# Patient Record
Sex: FEMALE | URBAN - METROPOLITAN AREA
[De-identification: names, ages, dates, MRNs, and addresses within clinical notes are randomized per-mention and may not be internally consistent; named-entity substitution may affect disease eponyms.]

---

## 2020-08-11 ENCOUNTER — OFFICE VISIT (OUTPATIENT)
Dept: URBAN - METROPOLITAN AREA CLINIC 48 | Facility: CLINIC | Age: 60
End: 2020-08-11
Payer: COMMERCIAL

## 2020-08-11 DIAGNOSIS — H33.311 HORSESHOE TEAR OF RETINA WITHOUT DETACHMENT, RIGHT EYE: Primary | ICD-10-CM

## 2020-08-11 PROCEDURE — 92004 COMPRE OPH EXAM NEW PT 1/>: CPT | Performed by: OPHTHALMOLOGY

## 2020-08-11 ASSESSMENT — KERATOMETRY
OD: 43.13
OS: 43.00

## 2020-08-11 ASSESSMENT — INTRAOCULAR PRESSURE
OD: 16
OS: 17

## 2020-08-11 NOTE — IMPRESSION/PLAN
Impression: Horseshoe tear of retina without detachment, right eye: H33.311. Plan: Horseshoe tear  OD Adjacent hole at 2 o'clock, don't see SRF on Ultrasound B, no Retinal detachment seen. Patient advised to come NPO. Refer to Dr. Hussain Manzano tomorrow

## 2020-08-12 ENCOUNTER — OFFICE VISIT (OUTPATIENT)
Dept: URBAN - METROPOLITAN AREA CLINIC 48 | Facility: CLINIC | Age: 60
End: 2020-08-12
Payer: COMMERCIAL

## 2020-08-12 ENCOUNTER — SURGERY (OUTPATIENT)
Dept: URBAN - METROPOLITAN AREA SURGERY 26 | Facility: SURGERY | Age: 60
End: 2020-08-12
Payer: COMMERCIAL

## 2020-08-12 PROCEDURE — 67145 PROPH RTA DTCHMNT PC: CPT | Performed by: OPHTHALMOLOGY

## 2020-08-12 PROCEDURE — 99213 OFFICE O/P EST LOW 20 MIN: CPT | Performed by: OPHTHALMOLOGY

## 2020-08-12 ASSESSMENT — INTRAOCULAR PRESSURE
OS: 17
OD: 15
OS: 17
OD: 15

## 2020-08-12 NOTE — IMPRESSION/PLAN
Impression: Horseshoe tear of retina without detachment, right eye: H33.311. OD. Plan: OCT ordered and performed today. Discussed diagnosis with patient. The clinical exam is consistent with a retinal tear. The patient was advised this could progress into a retinal detachment causing further visual loss if urgent treatment is not performed. We discussed the natural history of retinal tears and the R/B/A of the treatment options including laser retinopexy, Cryotherapy and Observation. Risk of treatment include reduced peripheral vision, pain, swelling, Intraocular hemorrhage, progressive retinal tear or detachment and the possible need for sx. After discussing the R/B/A of each treatment option. The patient elects to proceed with laser treatment to the retinal tear in the right eye.  RL-1

## 2020-08-19 ENCOUNTER — POST-OPERATIVE VISIT (OUTPATIENT)
Dept: URBAN - METROPOLITAN AREA CLINIC 48 | Facility: CLINIC | Age: 60
End: 2020-08-19
Payer: COMMERCIAL

## 2020-08-19 DIAGNOSIS — Z09 ENCNTR FOR F/U EXAM AFT TRTMT FOR COND OTH THAN MALIG NEOPLM: Primary | ICD-10-CM

## 2020-08-19 PROCEDURE — 99024 POSTOP FOLLOW-UP VISIT: CPT | Performed by: OPHTHALMOLOGY

## 2020-08-19 ASSESSMENT — INTRAOCULAR PRESSURE
OS: 20
OD: 19

## 2020-08-25 ENCOUNTER — POST-OPERATIVE VISIT (OUTPATIENT)
Dept: URBAN - METROPOLITAN AREA CLINIC 48 | Facility: CLINIC | Age: 60
End: 2020-08-25
Payer: COMMERCIAL

## 2020-08-25 PROCEDURE — 99024 POSTOP FOLLOW-UP VISIT: CPT | Performed by: OPTOMETRIST

## 2020-08-25 ASSESSMENT — INTRAOCULAR PRESSURE
OD: 18
OS: 16

## 2020-08-25 NOTE — IMPRESSION/PLAN
Impression: S/P PCA Laser, RD, Prophylaxis Laser OD - 13 Days. Encounter for surgical aftercare following surgery on a sense organ  Z48.810. Post operative instructions reviewed - Plan: --Continue Artificial Tears OU PRN Keep appointment on 9/16/2020 PO with Dr. Dereje Galicia

## 2020-09-16 ENCOUNTER — POST-OPERATIVE VISIT (OUTPATIENT)
Dept: URBAN - METROPOLITAN AREA CLINIC 48 | Facility: CLINIC | Age: 60
End: 2020-09-16
Payer: COMMERCIAL

## 2020-09-16 PROCEDURE — 99024 POSTOP FOLLOW-UP VISIT: CPT | Performed by: OPHTHALMOLOGY

## 2020-09-16 ASSESSMENT — INTRAOCULAR PRESSURE
OD: 15
OS: 18

## 2020-09-16 NOTE — IMPRESSION/PLAN
Impression: S/P RD/ PCA laser OD - 35 Days. Encounter for surgical aftercare following surgery on a sense organ  Z48.810. Plan: --Advised patient to use artificial tears for comfort.

## 2020-11-12 ENCOUNTER — OFFICE VISIT (OUTPATIENT)
Dept: URBAN - METROPOLITAN AREA CLINIC 48 | Facility: CLINIC | Age: 60
End: 2020-11-12
Payer: COMMERCIAL

## 2020-11-12 DIAGNOSIS — H33.011 RETINAL DETACHMENT WITH SINGLE BREAK, RIGHT EYE: Primary | ICD-10-CM

## 2020-11-12 PROCEDURE — 92012 INTRM OPH EXAM EST PATIENT: CPT | Performed by: OPHTHALMOLOGY

## 2020-11-12 ASSESSMENT — INTRAOCULAR PRESSURE
OS: 16
OD: 15

## 2020-11-12 NOTE — IMPRESSION/PLAN
Impression: Retinal detachment with single break, right eye: H33.011. Plan: Supero retinal detachment
advised patient to rest, sleep on OS side RC with Dr. Jacque DAWN, tomorrow

## 2020-11-13 ENCOUNTER — OFFICE VISIT (OUTPATIENT)
Dept: URBAN - METROPOLITAN AREA CLINIC 48 | Facility: CLINIC | Age: 60
End: 2020-11-13
Payer: COMMERCIAL

## 2020-11-13 DIAGNOSIS — H33.021 RETINAL DETACHMENT WITH MULTIPLE BREAKS, RIGHT EYE: Primary | ICD-10-CM

## 2020-11-13 PROCEDURE — 92134 CPTRZ OPH DX IMG PST SGM RTA: CPT | Performed by: OPHTHALMOLOGY

## 2020-11-13 PROCEDURE — 99214 OFFICE O/P EST MOD 30 MIN: CPT | Performed by: OPHTHALMOLOGY

## 2020-11-13 RX ORDER — OFLOXACIN 3 MG/ML
0.3 % SOLUTION/ DROPS OPHTHALMIC
Qty: 1 | Refills: 0 | Status: INACTIVE
Start: 2020-11-13 | End: 2021-05-14

## 2020-11-13 RX ORDER — PREDNISOLONE ACETATE 10 MG/ML
1 % SUSPENSION/ DROPS OPHTHALMIC
Qty: 1 | Refills: 2 | Status: INACTIVE
Start: 2020-11-13 | End: 2020-12-09

## 2020-11-13 ASSESSMENT — INTRAOCULAR PRESSURE
OS: 18
OD: 13

## 2020-11-13 NOTE — IMPRESSION/PLAN
Impression: Retinal detachment with multiple breaks, right eye: H33.021. Plan: OCT ordered and performed today. Discussed diagnosis with patient. The clinical exam is consistent with a macula sparing retinal detachment. To reduce the risk of further vision loss, urgent surgical intervention is strongly recommended. Discussed treatment options, the various techniques to repair an RD discussed with patient including scleral buckle, silicone oil and pneumatic retinopexy. In addition altitude with gas bubble were discussed. Recommend sx, after a through discussion of surgical R/B/A. The patient understands the potential risks of sx, including (but not limited to) bleeding, pain, infection, loss of vision, loss of eye and possible need for more sx. The patient also understands that pre detachment visual acuity may not return. The patient elects to proceed with sx RL-2 Schedule Sx. in X ASAP.

## 2020-11-14 ENCOUNTER — SURGERY (OUTPATIENT)
Dept: URBAN - METROPOLITAN AREA SURGERY 15 | Facility: SURGERY | Age: 60
End: 2020-11-14
Payer: COMMERCIAL

## 2020-11-14 PROCEDURE — 67107 REPAIR DETACHED RETINA: CPT | Performed by: OPHTHALMOLOGY

## 2020-11-14 RX ORDER — OXYCODONE HYDROCHLORIDE AND ACETAMINOPHEN 5; 325 MG/1; MG/1
TABLET ORAL
Qty: 8 | Refills: 0 | Status: INACTIVE
Start: 2020-11-14 | End: 2021-05-14

## 2020-11-14 RX ORDER — ONDANSETRON HYDROCHLORIDE 4 MG/1
4 MG TABLET, FILM COATED ORAL
Qty: 4 | Refills: 0 | Status: INACTIVE
Start: 2020-11-14 | End: 2021-05-14

## 2020-11-18 ENCOUNTER — POST-OPERATIVE VISIT (OUTPATIENT)
Dept: URBAN - METROPOLITAN AREA CLINIC 48 | Facility: CLINIC | Age: 60
End: 2020-11-18
Payer: COMMERCIAL

## 2020-11-18 DIAGNOSIS — Z48.810 ENCOUNTER FOR SURGICAL AFTERCARE FOLLOWING SURGERY ON THE SENSE ORGANS: Primary | ICD-10-CM

## 2020-11-18 PROCEDURE — 99024 POSTOP FOLLOW-UP VISIT: CPT | Performed by: OPHTHALMOLOGY

## 2020-11-18 ASSESSMENT — INTRAOCULAR PRESSURE: OD: 14

## 2020-12-09 ENCOUNTER — POST-OPERATIVE VISIT (OUTPATIENT)
Dept: URBAN - METROPOLITAN AREA CLINIC 48 | Facility: CLINIC | Age: 60
End: 2020-12-09
Payer: COMMERCIAL

## 2020-12-09 PROCEDURE — 99024 POSTOP FOLLOW-UP VISIT: CPT | Performed by: OPHTHALMOLOGY

## 2020-12-09 RX ORDER — PREDNISOLONE ACETATE 10 MG/ML
1 % SUSPENSION/ DROPS OPHTHALMIC
Qty: 1 | Refills: 2 | Status: INACTIVE
Start: 2020-12-09 | End: 2021-05-14

## 2020-12-09 ASSESSMENT — INTRAOCULAR PRESSURE
OS: 17
OD: 14

## 2020-12-09 NOTE — IMPRESSION/PLAN
Impression: S/P 99453 Scleral Buckle, Retinal Cryopexy OD - 25 Days. Encounter for surgical aftercare following surgery on a sense organ  Z48.810. Plan: Discussed macular hole development, Advised waiting to see if hole resolves on its own, if not discussed Sx. to repair and the possiblity of cataract Sx. 12 months after, will re evaluate in 1 month. Contiune Pred and AFT as directed, RTC in 1 month for Post OP and OCT. Decrease Pred QID OD and D/C Ocuflox.  AFTs PRN OU

## 2020-12-23 ENCOUNTER — OFFICE VISIT (OUTPATIENT)
Dept: URBAN - METROPOLITAN AREA CLINIC 48 | Facility: CLINIC | Age: 60
End: 2020-12-23
Payer: COMMERCIAL

## 2020-12-23 DIAGNOSIS — H35.341 MACULAR CYST, HOLE, OR PSEUDOHOLE, RIGHT EYE: ICD-10-CM

## 2020-12-23 PROCEDURE — 92012 INTRM OPH EXAM EST PATIENT: CPT | Performed by: OPHTHALMOLOGY

## 2020-12-23 ASSESSMENT — VISUAL ACUITY: OD: 20/70

## 2020-12-23 ASSESSMENT — INTRAOCULAR PRESSURE
OD: 16
OS: 15
OD: 15
OS: 17

## 2020-12-23 NOTE — IMPRESSION/PLAN
Impression: Macular cyst, hole, or pseudohole, right eye: H35.341 Right.  Plan: see Dr. Tiara Plascencia plan from earlier today

## 2021-05-14 ENCOUNTER — OFFICE VISIT (OUTPATIENT)
Dept: URBAN - METROPOLITAN AREA CLINIC 48 | Facility: CLINIC | Age: 61
End: 2021-05-14
Payer: COMMERCIAL

## 2021-05-14 PROCEDURE — 99213 OFFICE O/P EST LOW 20 MIN: CPT | Performed by: OPHTHALMOLOGY

## 2021-05-14 PROCEDURE — 92134 CPTRZ OPH DX IMG PST SGM RTA: CPT | Performed by: OPHTHALMOLOGY

## 2021-05-14 ASSESSMENT — INTRAOCULAR PRESSURE
OS: 18
OD: 16

## 2021-05-14 NOTE — IMPRESSION/PLAN
Impression: Age-related nuclear cataract, right eye: H25.11. Right. S/p Mac hole repair S/p RD repair Plan: No retinal contraindication to CE IOL. Discussed with the patient in length the different options she can ask for if she would like to stay near sighted. Patient understands and agrees with the plan.

## 2021-06-28 ENCOUNTER — OFFICE VISIT (OUTPATIENT)
Dept: URBAN - METROPOLITAN AREA CLINIC 48 | Facility: CLINIC | Age: 61
End: 2021-06-28
Payer: COMMERCIAL

## 2021-06-28 PROCEDURE — 92014 COMPRE OPH EXAM EST PT 1/>: CPT | Performed by: STUDENT IN AN ORGANIZED HEALTH CARE EDUCATION/TRAINING PROGRAM

## 2021-06-28 PROCEDURE — 92134 CPTRZ OPH DX IMG PST SGM RTA: CPT | Performed by: STUDENT IN AN ORGANIZED HEALTH CARE EDUCATION/TRAINING PROGRAM

## 2021-06-28 ASSESSMENT — INTRAOCULAR PRESSURE
OD: 15
OS: 17

## 2021-06-28 NOTE — IMPRESSION/PLAN
Impression: Vitreous degeneration, left eye: H43.812. No RD/RT on  
OCT MAC Findings: post surgical changes OD, no IRF/SRF OS

OS 
small area of lattice high concern for RT given hx. Plan:  All signs and risks of retinal detachment and tears were discussed in detail. Patient instructed to call the office immediately if any symptoms noted.   Recommend the patient return to office for follow up tomorrow with Dr. Michelle Hines and possibly Wednesday with Dr. Hema Thomas, pending Dr. Michelle Hines exam.

## 2021-06-29 ENCOUNTER — OFFICE VISIT (OUTPATIENT)
Dept: URBAN - METROPOLITAN AREA CLINIC 48 | Facility: CLINIC | Age: 61
End: 2021-06-29
Payer: COMMERCIAL

## 2021-06-29 DIAGNOSIS — H43.812 VITREOUS DEGENERATION, LEFT EYE: Primary | ICD-10-CM

## 2021-06-29 PROCEDURE — 92014 COMPRE OPH EXAM EST PT 1/>: CPT | Performed by: OPHTHALMOLOGY

## 2021-06-29 ASSESSMENT — INTRAOCULAR PRESSURE
OS: 16
OD: 13

## 2021-06-29 NOTE — IMPRESSION/PLAN
Impression: Vitreous degeneration, left eye: H43.812. No RD/RT on  
OCT MAC Findings: post surgical changes OD, no IRF/SRF OS

OS 
small area of lattice high concern for RT given hx. Plan: lattice with out retina elevation and no heme. Discussed strict RD precautions with patient, patient understands. If patient notices any shade or curtain over the eye in any direction, patient to call office to be seen sooner. RTC July 8th DE with Dr. Kye Davenport RTC July 21st DE/OCT mac with Dr. Severo Aguilera
or PRN

## 2021-07-01 ENCOUNTER — OFFICE VISIT (OUTPATIENT)
Dept: URBAN - METROPOLITAN AREA CLINIC 48 | Facility: CLINIC | Age: 61
End: 2021-07-01
Payer: COMMERCIAL

## 2021-07-01 PROCEDURE — 92014 COMPRE OPH EXAM EST PT 1/>: CPT | Performed by: OPHTHALMOLOGY

## 2021-07-01 ASSESSMENT — INTRAOCULAR PRESSURE
OD: 16
OS: 18

## 2021-07-01 NOTE — IMPRESSION/PLAN
Impression: Vitreous degeneration, left eye: H43.812. No RD/RT on  Plan: Patient is symptomatic, on depressed exam there are no tears seen OS. Patient has a indeterminate Clinton OS, 2 days ago patient has a negative Clinton. Patient is concerned for Retinal detachment, patient has history of RD to the right eye . RD precautions discussed with patient. Patient offered to see RCA tomorrow for a consult or have observation throughout the weekend. Patient would like to see RCA. Refer to RCA tomorrow for worsening PVD. RTC Wednesday DE with Dr. Solo Patel  
per Dr. Yadira Conteh and Dr. Ewelina Morton

## 2021-07-02 ENCOUNTER — OFFICE VISIT (OUTPATIENT)
Dept: URBAN - METROPOLITAN AREA CLINIC 7 | Facility: CLINIC | Age: 61
End: 2021-07-02
Payer: COMMERCIAL

## 2021-07-02 DIAGNOSIS — H25.13 AGE-RELATED NUCLEAR CATARACT, BILATERAL: ICD-10-CM

## 2021-07-02 DIAGNOSIS — H33.8 OTHER RETINAL DETACHMENT: ICD-10-CM

## 2021-07-02 PROCEDURE — 99204 OFFICE O/P NEW MOD 45 MIN: CPT | Performed by: OPHTHALMOLOGY

## 2021-07-02 ASSESSMENT — INTRAOCULAR PRESSURE
OD: 16
OS: 18

## 2021-07-02 NOTE — IMPRESSION/PLAN
Impression: Macular cyst, hole, or pseudohole, right eye: H35.341 Right.  Plan: --small LMH confirmed on OCT testing
--monitor coagulation(Bleeding disorder R/T clinical cond/anti-coags)

## 2021-07-02 NOTE — IMPRESSION/PLAN
Impression: Vitreous degeneration, left eye: H43.812. Left. Plan: --exam confirms an acute hemorrhagic PVD OS
--no e/o RT/RD on DFE with scleral depression
--OCT shows complete hyaloid separation from macula --findings/diagnosis d/w pt in detail 
--RT/RD warning symptoms discussed --pt understands to call immediately with vision changes F/U with Dr. Miguel Angel Carpio in [place] next week as scheduled

## 2021-07-02 NOTE — IMPRESSION/PLAN
Impression: Age-related nuclear cataract, bilateral: H25.13. Bilateral. Plan: --VS post PPV cataract OD, will need CE/IOL at a later date
--NVS OS, monitor

## 2021-07-02 NOTE — IMPRESSION/PLAN
Impression: Other retinal detachment: H33.8. Right. 
S/P SBP/PPV Plan: --retina remains attached and stable
--RDW discussed

## 2021-07-07 ENCOUNTER — SURGERY (OUTPATIENT)
Dept: URBAN - METROPOLITAN AREA SURGERY 26 | Facility: SURGERY | Age: 61
End: 2021-07-07
Payer: COMMERCIAL

## 2021-07-07 ENCOUNTER — OFFICE VISIT (OUTPATIENT)
Dept: URBAN - METROPOLITAN AREA CLINIC 48 | Facility: CLINIC | Age: 61
End: 2021-07-07
Payer: COMMERCIAL

## 2021-07-07 DIAGNOSIS — H33.012 RETINAL DETACHMENT WITH SINGLE BREAK, LEFT EYE: Primary | ICD-10-CM

## 2021-07-07 PROCEDURE — 99213 OFFICE O/P EST LOW 20 MIN: CPT | Performed by: OPHTHALMOLOGY

## 2021-07-07 PROCEDURE — 92134 CPTRZ OPH DX IMG PST SGM RTA: CPT | Performed by: OPHTHALMOLOGY

## 2021-07-07 PROCEDURE — 67107 REPAIR DETACHED RETINA: CPT | Performed by: OPHTHALMOLOGY

## 2021-07-07 ASSESSMENT — INTRAOCULAR PRESSURE
OD: 15
OD: 15
OS: 17
OS: 17

## 2021-07-07 NOTE — IMPRESSION/PLAN
Impression: Retinal detachment with single break, left eye: H33.012. Left. Plan: OCT ordered and performed today. Discussed diagnosis with patient. The clinical exam is consistent with a macula sparing retinal detachment. To reduce the risk of further vision loss, urgent surgical intervention is strongly recommended. Discussed treatment options, the various techniques to repair an RD discussed with patient including scleral buckle, silicone oil and pneumatic retinopexy. In addition altitude with gas bubble were discussed. Recommend sx, after a through discussion of surgical R/B/A. The patient understands the potential risks of sx, including (but not limited to) bleeding, pain, infection, loss of vision, loss of eye and possible need for more sx. The patient also understands that pre detachment visual acuity may not return. The patient elects to proceed with sx OS.  RL-2

## 2021-07-08 ENCOUNTER — POST-OPERATIVE VISIT (OUTPATIENT)
Dept: URBAN - METROPOLITAN AREA CLINIC 48 | Facility: CLINIC | Age: 61
End: 2021-07-08
Payer: COMMERCIAL

## 2021-07-08 PROCEDURE — 99024 POSTOP FOLLOW-UP VISIT: CPT | Performed by: STUDENT IN AN ORGANIZED HEALTH CARE EDUCATION/TRAINING PROGRAM

## 2021-07-08 RX ORDER — PREDNISOLONE ACETATE 10 MG/ML
1 % SUSPENSION/ DROPS OPHTHALMIC
Qty: 5 | Refills: 1 | Status: INACTIVE
Start: 2021-07-08 | End: 2021-08-10

## 2021-07-08 RX ORDER — OFLOXACIN 3 MG/ML
0.3 % SOLUTION/ DROPS OPHTHALMIC
Qty: 5 | Refills: 1 | Status: INACTIVE
Start: 2021-07-08 | End: 2021-07-15

## 2021-07-08 ASSESSMENT — INTRAOCULAR PRESSURE: OS: 25

## 2021-07-08 NOTE — IMPRESSION/PLAN
Impression: S/P (66444) Retinal Detachment; Scleral Buckle; Retinal Cryopexy OS - 1 Day. Encounter for surgical aftercare following surgery on a sense organ  Z48.810. Post operative instructions reviewed - Condition is improving - Patent to use OFL and PF qid OS Plan: Keep appointment with Dr. Jojo Baker

## 2021-07-14 ENCOUNTER — POST-OPERATIVE VISIT (OUTPATIENT)
Dept: URBAN - METROPOLITAN AREA CLINIC 48 | Facility: CLINIC | Age: 61
End: 2021-07-14
Payer: COMMERCIAL

## 2021-07-14 PROCEDURE — 99024 POSTOP FOLLOW-UP VISIT: CPT | Performed by: OPHTHALMOLOGY

## 2021-07-14 ASSESSMENT — INTRAOCULAR PRESSURE
OS: 18
OD: 18

## 2021-07-14 NOTE — IMPRESSION/PLAN
Impression: S/P (89021) Retinal Detachment; Scleral Buckle; Retinal Cryopexy OS - 7 Days. Encounter for surgical aftercare following surgery on a sense organ  Z48.810.  Plan: --Continue Prednisolone acetate QID OS

## 2021-07-15 ENCOUNTER — OFFICE VISIT (OUTPATIENT)
Dept: URBAN - METROPOLITAN AREA CLINIC 48 | Facility: CLINIC | Age: 61
End: 2021-07-15
Payer: COMMERCIAL

## 2021-07-15 DIAGNOSIS — H25.11 AGE-RELATED NUCLEAR CATARACT, RIGHT EYE: Primary | ICD-10-CM

## 2021-07-15 PROCEDURE — 99080 SPECIAL REPORTS OR FORMS: CPT | Performed by: OPHTHALMOLOGY

## 2021-07-15 PROCEDURE — 92014 COMPRE OPH EXAM EST PT 1/>: CPT | Performed by: OPHTHALMOLOGY

## 2021-07-15 ASSESSMENT — INTRAOCULAR PRESSURE
OS: 16
OD: 12

## 2021-07-15 NOTE — IMPRESSION/PLAN
Impression: Age-related nuclear cataract, right eye: H25.11. Plan: The patient has a visually significant cataract in the right eye. After discussion with the patient and careful examination it has been determined that a cataract in the right eye is accounting for a significant amount of the patient's visual symptoms. Cataract surgery and the associated risks, benefits, alternatives, expectations, and recovery were discussed in detail with the patient. All questions were answered. The patient understands that there may be some limitation in visual potential given any pre-existing ocular disease. Discussed option of eye drops with patient, patient elects Prednisolone, Ketorolac, Ofloxacin   , discussed possible side effects of drops. The patient desires cataract surgery in the right eye. Schedule cataract surgery in the right eye. RL 2 Patient candidate for  Standard lens distance target Surgeon: Dr. Thiago Carmona

## 2021-07-20 ENCOUNTER — TESTING ONLY (OUTPATIENT)
Dept: URBAN - METROPOLITAN AREA CLINIC 48 | Facility: CLINIC | Age: 61
End: 2021-07-20
Payer: COMMERCIAL

## 2021-07-20 PROCEDURE — 92014 COMPRE OPH EXAM EST PT 1/>: CPT | Performed by: STUDENT IN AN ORGANIZED HEALTH CARE EDUCATION/TRAINING PROGRAM

## 2021-07-20 PROCEDURE — 92136 OPHTHALMIC BIOMETRY: CPT | Performed by: OPHTHALMOLOGY

## 2021-07-20 PROCEDURE — 92134 CPTRZ OPH DX IMG PST SGM RTA: CPT | Performed by: STUDENT IN AN ORGANIZED HEALTH CARE EDUCATION/TRAINING PROGRAM

## 2021-07-20 ASSESSMENT — INTRAOCULAR PRESSURE
OD: 16
OS: 15
OD: 16
OS: 15

## 2021-07-20 ASSESSMENT — PACHYMETRY
OD: 3.39
OD: 25.69

## 2021-07-20 ASSESSMENT — KERATOMETRY: OD: 43.05

## 2021-07-20 NOTE — IMPRESSION/PLAN
Impression: Vitreous detachment of left eye: H43.812. OCT MAC findings: OD Mild disruption to foveval contour,  OS Normal Plan: -No RT/RD noted on today's exam
-RD/RT precautions reviewed. Patient knows to rtc or call if any symptoms develop
-Discussed w/pt. hold off on exam with optometrist until cleared at post op cataract visit RTC tomorrow with Dr. Yosvany Dover for Post-Op visit

## 2021-07-21 ENCOUNTER — POST-OPERATIVE VISIT (OUTPATIENT)
Dept: URBAN - METROPOLITAN AREA CLINIC 48 | Facility: CLINIC | Age: 61
End: 2021-07-21
Payer: COMMERCIAL

## 2021-07-21 PROCEDURE — 99024 POSTOP FOLLOW-UP VISIT: CPT | Performed by: OPHTHALMOLOGY

## 2021-07-21 RX ORDER — ERYTHROMYCIN 5 MG/G
OINTMENT OPHTHALMIC
Qty: 1 | Refills: 1 | Status: INACTIVE
Start: 2021-07-21 | End: 2021-12-22

## 2021-07-21 ASSESSMENT — INTRAOCULAR PRESSURE
OS: 14
OD: 12

## 2021-07-21 NOTE — IMPRESSION/PLAN
Impression: S/P (05201) Retinal Detachment; Scleral Buckle; Retinal Cryopexy OS - 14 Days. Encounter for surgical aftercare following surgery on a sense organ  Z48.810. Plan: --Advised patient to use artificial tears for comfort.  Continue Pred QID OS

## 2021-08-03 ENCOUNTER — SURGERY (OUTPATIENT)
Dept: URBAN - METROPOLITAN AREA SURGERY 26 | Facility: SURGERY | Age: 61
End: 2021-08-03
Payer: COMMERCIAL

## 2021-08-03 PROCEDURE — 66984 XCAPSL CTRC RMVL W/O ECP: CPT | Performed by: OPHTHALMOLOGY

## 2021-08-04 ENCOUNTER — POST-OPERATIVE VISIT (OUTPATIENT)
Dept: URBAN - METROPOLITAN AREA CLINIC 48 | Facility: CLINIC | Age: 61
End: 2021-08-04
Payer: COMMERCIAL

## 2021-08-04 PROCEDURE — 99024 POSTOP FOLLOW-UP VISIT: CPT | Performed by: OPHTHALMOLOGY

## 2021-08-04 ASSESSMENT — INTRAOCULAR PRESSURE
OD: 16
OS: 16

## 2021-08-04 NOTE — IMPRESSION/PLAN
Impression: S/P Cataract Extraction by phacoemulsification with IOL placement 58063 OD - 1 Day. Encounter for surgical aftercare following surgery on a sense organ  Z48.810.  Post operative instructions reviewed -

PF, RAFAEL, OFL qid OD
PF  qid OS Plan: RTC keep appointment with Dr. Phillip Simple Aug 10, no dilation needed only if patient has symptoms,  Dr. Bev Orta on Aug11

## 2021-08-10 ENCOUNTER — POST-OPERATIVE VISIT (OUTPATIENT)
Dept: URBAN - METROPOLITAN AREA CLINIC 48 | Facility: CLINIC | Age: 61
End: 2021-08-10
Payer: COMMERCIAL

## 2021-08-10 PROCEDURE — 99024 POSTOP FOLLOW-UP VISIT: CPT | Performed by: OPHTHALMOLOGY

## 2021-08-10 RX ORDER — PREDNISOLONE ACETATE 10 MG/ML
1 % SUSPENSION/ DROPS OPHTHALMIC
Qty: 5 | Refills: 1 | Status: INACTIVE
Start: 2021-08-10 | End: 2021-12-22

## 2021-08-10 ASSESSMENT — INTRAOCULAR PRESSURE
OD: 13
OS: 12

## 2021-08-10 NOTE — IMPRESSION/PLAN
Impression: S/P Cataract Extraction by phacoemulsification with IOL placement 97753 OD - 7 Days. Encounter for surgical aftercare following surgery on a sense organ  Z48.810.  Excellent post op course Plan: patient to use Pred 5 times daily OD, 
continue regimine in OS, 

patient to keep appt with Dr. Fran Wing

## 2021-08-11 ENCOUNTER — POST-OPERATIVE VISIT (OUTPATIENT)
Dept: URBAN - METROPOLITAN AREA CLINIC 48 | Facility: CLINIC | Age: 61
End: 2021-08-11
Payer: COMMERCIAL

## 2021-08-11 PROCEDURE — 99024 POSTOP FOLLOW-UP VISIT: CPT | Performed by: OPHTHALMOLOGY

## 2021-08-11 ASSESSMENT — INTRAOCULAR PRESSURE
OS: 16
OD: 18

## 2021-08-11 NOTE — IMPRESSION/PLAN
Impression: S/P (37292) Retinal Detachment; Scleral Buckle; Retinal Cryopexy OS - 35 Days. Encounter for surgical aftercare following surgery on a sense organ  Z48.810. Plan: Pt. to keep both Apt.s on the 25th with Dr. Kulwant Barnard and Dr. Florence Cosby. PT. to continue Pred BID OS and QID OD and E-mycin melvin PRN for itching. Pt. to discuss refraction options at the next apt.

## 2021-08-25 ENCOUNTER — POST-OPERATIVE VISIT (OUTPATIENT)
Dept: URBAN - METROPOLITAN AREA CLINIC 48 | Facility: CLINIC | Age: 61
End: 2021-08-25
Payer: COMMERCIAL

## 2021-08-25 PROCEDURE — 99024 POSTOP FOLLOW-UP VISIT: CPT | Performed by: OPHTHALMOLOGY

## 2021-08-25 ASSESSMENT — INTRAOCULAR PRESSURE
OS: 10
OD: 12

## 2021-08-25 NOTE — IMPRESSION/PLAN
Impression: S/P (22633) Retinal Detachment; Scleral Buckle; Retinal Cryopexy OS - 49 Days. Encounter for surgical aftercare following surgery on a sense organ  Z48.810.  Plan: Continue Pred QD OS and taper Pred OD

## 2021-09-22 ENCOUNTER — POST-OPERATIVE VISIT (OUTPATIENT)
Dept: URBAN - METROPOLITAN AREA CLINIC 48 | Facility: CLINIC | Age: 61
End: 2021-09-22
Payer: COMMERCIAL

## 2021-09-22 PROCEDURE — 99024 POSTOP FOLLOW-UP VISIT: CPT | Performed by: OPHTHALMOLOGY

## 2021-09-22 ASSESSMENT — INTRAOCULAR PRESSURE
OS: 15
OD: 13

## 2021-09-22 NOTE — IMPRESSION/PLAN
Impression: S/P (47154) Retinal Detachment; Scleral Buckle; Retinal Cryopexy OS - 77 Days. Encounter for surgical aftercare following surgery on a sense organ  Z48.810.  Post operative instructions reviewed - Plan: Continue Pred BID OD and AFTs TID OU

## 2021-12-22 ENCOUNTER — OFFICE VISIT (OUTPATIENT)
Dept: URBAN - METROPOLITAN AREA CLINIC 48 | Facility: CLINIC | Age: 61
End: 2021-12-22
Payer: COMMERCIAL

## 2021-12-22 DIAGNOSIS — H35.81 RETINAL EDEMA: ICD-10-CM

## 2021-12-22 PROCEDURE — 99213 OFFICE O/P EST LOW 20 MIN: CPT | Performed by: OPHTHALMOLOGY

## 2021-12-22 PROCEDURE — 92134 CPTRZ OPH DX IMG PST SGM RTA: CPT | Performed by: OPHTHALMOLOGY

## 2021-12-22 RX ORDER — BROMFENAC SODIUM 0.7 MG/ML
0.07 % SOLUTION/ DROPS OPHTHALMIC
Qty: 1 | Refills: 1 | Status: ACTIVE
Start: 2021-12-22

## 2021-12-22 ASSESSMENT — INTRAOCULAR PRESSURE
OS: 16
OD: 19

## 2021-12-22 NOTE — IMPRESSION/PLAN
Impression: Retinal edema: H35.81. Right. Left. Bilateral. Plan: OCT ordered and performed today. Discussed diagnosis with patient. The clinical exam and OCT is consistent with Cystoid Macular Edema. Discussed treatment options that her Retinal specialist in [facility] may perform. Patient understands and agrees with the plan. Recommend prolensa QHS OU, to help with discomfort at this time.

## 2021-12-22 NOTE — IMPRESSION/PLAN
Impression: S/P (68504) Retinal Detachment; Scleral Buckle; Retinal Cryopexy OS - 77 Days. Encounter for surgical aftercare following surgery on a sense organ  Z48.810. Post operative instructions reviewed - Plan: OCT ordered and performed today. Discussed diagnosis with the patient, retina is stable with scleral buckle. The patient see's retinal specialist in [facility], recommend continued care and follow up.

## 2022-07-04 NOTE — IMPRESSION/PLAN
Impression: Age-related nuclear cataract, right eye: H25.11. Plan: Cataract surgery being recommended by Dr. Katey Roque due to Vitrectomy being done and would facilitate cataract to mature and need to be removed. The patient has a visually significant cataract in right eye, after discussion with the patient and careful examination it has been determined that a cataract in right eye is accounting for a significant amount of the patient's visual symptoms. Cataract surgery and the associated risks, benefits, alternatives, expectations, and recovery were discussed in detail with the patient. All questions were answered. The patient understands that there may be some limitation in visual potential given any pre-existing ocular disease. The patient desires cataract surgery in right eye. Patient desires standard lens for distance target. Patient elects to use (ERX standard drops ). All potential side effects and benefits of medication discussed . Patient is to start drop(s) to operative eye one day prior to surgery. Schedule cataract surgery in right eye only, combo surgery with retina.  RL 2 No

## 2022-10-10 NOTE — IMPRESSION/PLAN
Impression: Primary iridocyclitis, right eye: H20.011. Plan: Discussed findings and treatment with patient, will start Pred Q3H given inflammation. Will continue to monitor, patient knows to come back if symptoms worsen or changes in vision occur. Start: Pred Q3H OD  [address] sent to pharmacy today.  


RTC Monday [facility] check

## 2022-10-21 NOTE — IMPRESSION/PLAN
Impression: Primary iridocyclitis, right eye: H20.011. Plan: Discussed findings with patient will continue to monitor, taper Pred to QID Taper: Pred QID OD 
continue: AFT up to QID OU 

RTC 1 week follow up

## 2022-12-09 NOTE — IMPRESSION/PLAN
Impression: Ocular hypertension, right eye: H40.051. Plan: IOP of 22 with Ozurdex. Possible steroid response RTC 3mo IOP check w/ Dr. Justine Urias or Dr. Phillip Simple

## 2022-12-09 NOTE — IMPRESSION/PLAN
Impression: Retinal edema: H35.81. Plan: Continue to monitor with Dr. Johnathon Kenyon Advised patient we can follow up on Retina and do injection in clinic if she is interested

## 2022-12-09 NOTE — IMPRESSION/PLAN
Impression: Other secondary cataract, right eye: H26.491. Plan: Appears to be visually significant. Will monitor for now RTC 3mo YAG Mack w/ Dr. Conrad Dietrich or Dr. Felisha Gilliam

## 2022-12-09 NOTE — IMPRESSION/PLAN
Impression: Primary iridocyclitis, right eye: H20.011. Mild Plan: Most symptoms today are burning sensation, mostly bilateral, Will start on Pred for rare cell. 
Start: Pred BID OD x10 days, then QD x10 days, then EOD x10 days then d/c
Use: Blink Gel Drops, cooled 2-4x daily OU

keep appointment as scheduled with Dr. Kylah Morales